# Patient Record
Sex: MALE | Race: WHITE
[De-identification: names, ages, dates, MRNs, and addresses within clinical notes are randomized per-mention and may not be internally consistent; named-entity substitution may affect disease eponyms.]

---

## 2019-12-20 ENCOUNTER — HOSPITAL ENCOUNTER (OUTPATIENT)
Dept: HOSPITAL 47 - RADMRIMAIN | Age: 34
Discharge: HOME | End: 2019-12-20
Attending: ORTHOPAEDIC SURGERY
Payer: COMMERCIAL

## 2019-12-20 DIAGNOSIS — M93.912: ICD-10-CM

## 2019-12-20 DIAGNOSIS — M12.812: Primary | ICD-10-CM

## 2019-12-22 NOTE — MR
EXAMINATION TYPE: MR shoulder LT wo con

 

DATE OF EXAM: 12/20/2019

 

COMPARISON: NONE

 

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
 the metaphysis proximal humerus all per order.

 

TECHNIQUE: Multiplanar, multisequence imaging of the left shoulder is performed without contrast.

 

FINDINGS:

Rotator Cuff: Distal supraspinatus and infraspinatus tendons are intact. Some increased signal distal
 supraspinatus tendon noted. Subscapularis tendon is intact. Rotator cuff muscle bulk is preserved.

 

Acromioclavicular Joint: Moderate narrowing. Underlying fat plane maintained. No significant spurring
. Type II downsloping acromion however is noted.

\

Glenohumeral Joint: Fairly moderate sized joint effusion with multiple intra-articular round lesions 
of low T1 and T2 signal measuring up to 5 mm in size. Probable calcified or ossified intra-articular 
loose bodies most prominent extending inferiorly. Mild to moderate narrowing most prominent inferior 
glenohumeral joint with small spur coronal image 18 inferior medial humeral head.

 

Labrum: The labrum appears grossly intact given limitation of non-arthrogram study.

 

Biceps Tendon: The long head of biceps is in normal location within bicipital groove.

 

Bone marrow signal: Heterogeneity consistent with red marrow reconversion.

 

Other: No additional significant abnormality is appreciated.

 

IMPRESSION: 

1. Moderate glenohumeral joint effusion with mild to moderate glenohumeral joint arthropathy, intra-a
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

2. Tendinosis distal supraspinatus tendon. No full-thickness rotator cuff or labral tear.

3. Type II downsloping acromion.